# Patient Record
Sex: MALE | Race: WHITE | Employment: OTHER | URBAN - METROPOLITAN AREA
[De-identification: names, ages, dates, MRNs, and addresses within clinical notes are randomized per-mention and may not be internally consistent; named-entity substitution may affect disease eponyms.]

---

## 2017-01-30 ENCOUNTER — HOSPITAL ENCOUNTER (OUTPATIENT)
Dept: GENERAL RADIOLOGY | Age: 82
Discharge: HOME | End: 2017-01-30
Attending: INTERNAL MEDICINE
Payer: MEDICARE

## 2017-01-30 DIAGNOSIS — M25.531 RIGHT WRIST PAIN: ICD-10-CM

## 2017-01-30 PROCEDURE — 73110 X-RAY EXAM OF WRIST: CPT

## 2017-12-20 ENCOUNTER — HOSPITAL ENCOUNTER (OUTPATIENT)
Dept: CT IMAGING | Age: 82
Discharge: HOME | End: 2017-12-20
Attending: INTERNAL MEDICINE
Payer: MEDICARE

## 2017-12-20 DIAGNOSIS — R26.89 LOSS OF BALANCE: ICD-10-CM

## 2017-12-20 DIAGNOSIS — G30.1 ALZHEIMER'S DISEASE WITH LATE ONSET (HCC): ICD-10-CM

## 2017-12-20 DIAGNOSIS — F02.80 ALZHEIMER'S DISEASE WITH LATE ONSET (HCC): ICD-10-CM

## 2017-12-20 PROCEDURE — 70450 CT HEAD/BRAIN W/O DYE: CPT

## 2018-12-18 ENCOUNTER — DOCUMENTATION ONLY (OUTPATIENT)
Dept: NEUROLOGY | Age: 83
End: 2018-12-18

## 2018-12-28 ENCOUNTER — TELEPHONE (OUTPATIENT)
Dept: NEUROLOGY | Age: 83
End: 2018-12-28

## 2018-12-28 ENCOUNTER — OFFICE VISIT (OUTPATIENT)
Dept: NEUROLOGY | Age: 83
End: 2018-12-28

## 2018-12-28 VITALS
WEIGHT: 176 LBS | HEART RATE: 78 BPM | BODY MASS INDEX: 23.84 KG/M2 | RESPIRATION RATE: 18 BRPM | HEIGHT: 72 IN | DIASTOLIC BLOOD PRESSURE: 70 MMHG | SYSTOLIC BLOOD PRESSURE: 110 MMHG

## 2018-12-28 DIAGNOSIS — R41.3 MEMORY LOSS: ICD-10-CM

## 2018-12-28 DIAGNOSIS — R25.1 TREMOR OF BOTH HANDS: Primary | ICD-10-CM

## 2018-12-28 DIAGNOSIS — R26.89 SHUFFLING GAIT: ICD-10-CM

## 2018-12-28 RX ORDER — MEMANTINE HYDROCHLORIDE 10 MG/1
TABLET ORAL
Refills: 1 | COMMUNITY
Start: 2018-11-08

## 2018-12-28 RX ORDER — CYANOCOBALAMIN 1000 UG/ML
1000 INJECTION, SOLUTION INTRAMUSCULAR; SUBCUTANEOUS
COMMUNITY

## 2018-12-28 RX ORDER — ALLOPURINOL 100 MG/1
TABLET ORAL
Refills: 99 | COMMUNITY
Start: 2018-11-16

## 2018-12-28 RX ORDER — CARBIDOPA AND LEVODOPA 25; 100 MG/1; MG/1
TABLET ORAL
Qty: 90 TAB | Refills: 5 | Status: SHIPPED | OUTPATIENT
Start: 2018-12-28 | End: 2019-05-24 | Stop reason: SDUPTHER

## 2018-12-28 RX ORDER — MELOXICAM 15 MG/1
TABLET ORAL
Refills: 1 | COMMUNITY
Start: 2018-11-07

## 2018-12-28 RX ORDER — DONEPEZIL HYDROCHLORIDE 10 MG/1
TABLET, FILM COATED ORAL
Refills: 99 | COMMUNITY
Start: 2018-11-23

## 2018-12-28 NOTE — PROGRESS NOTES
Neurology Consult Note HISTORY PROVIDED BY: patient and spouse Chief Complaint:  
Chief Complaint Patient presents with  Tremors Subjective:  
 Naomi Brasher is a 82 y.o.  male who presents in consultation for hand tremors. The pt reports that other people have noticed tremors more than he has. They report tremors mainly at rest. Has not noticed trouble with activity. No change in handwriting. His wife has noticed change in walking, more shuffling, stooped over. Their son is a physician in [place] and felt the pt needed to see a neurologist.   His PCP has diagnosed pt with early Alzheimers. His psychiatrist diagnosed MCI over 4 years ago. He is taking Aricept 10mg daily and Namenda 10mg bid. His wife reports first noticing memory difficulties at least 10 years ago, knows this b/c they moved to a condo in an adult community 10 years ago b/c of his memory difficulties. Pt relays a story of having to be taken off of a plane due to a back issue and not being able to stand up when it landed, his wife indicates that this is not a true story. They definitely have noticed decline in memory. Pt first noticed trouble with reading 3 books at one time, something he has done his whole life. 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 He is still driving, has never gotten lost. No safety issues at home. She notes he cannot recall conversations from the night before. Sleeps well. He doesn't have any bladder incontinence. No family h/o dementia or neurological issues. Notes from PCP received and reviewed - OV with Dr. Michael Holguin 12/4/18- pt c/o right hand tremor. He felt his memory was about the same. Labs - TSH 3.57, CBC - Hgb 17.6 with  .8, CMP - unremarkable. B12 815 Past Medical History:  
Diagnosis Date  Alzheimer's disease with late onset  Arthritis  BPH (benign prostatic hyperplasia)  Chronic pain  Degenerative disc disease, lumbar   
 lumbosacral spine  Depression  Gout  Vitamin B12 deficiency Past Surgical History:  
Procedure Laterality Date  HX CERVICAL FUSION    
 anterior Social History Socioeconomic History  Marital status:  Spouse name: Not on file  Number of children: Not on file  Years of education: Not on file  Highest education level: Not on file Social Needs  Financial resource strain: Not on file  Food insecurity - worry: Not on file  Food insecurity - inability: Not on file  Transportation needs - medical: Not on file  Transportation needs - non-medical: Not on file Occupational History  Occupation: Retired from [employer], [employer] and [employer] Tobacco Use  Smoking status: Former Smoker Last attempt to quit:  Years since quittin.0  Smokeless tobacco: Never Used Substance and Sexual Activity  Alcohol use: Yes Alcohol/week: 7.0 oz Types: 14 Glasses of wine per week Drinks per session: 1 or 2 Binge frequency: Daily or almost daily Comment: 2 glass of wine per day  Drug use: No  
 Sexual activity: No  
  Partners: Female Other Topics Concern  Not on file Social History Narrative , lives in [place] with wife Family History Problem Relation Age of Onset  Kidney Disease Mother Dec in early 30's with kidney infection with kidney failure  Dementia Father Dec 77yo, dementia at an older age  Cancer Brother Colon, Dec 61yo  Cancer Son Objective:  
Review of Systems Constitutional: Negative. HENT: Negative. Eyes: Negative. Respiratory: Negative. Snores, occ apneas Cardiovascular: Positive for leg swelling. Gastrointestinal: Negative. Genitourinary: Negative. Musculoskeletal: Positive for joint pain and myalgias. Skin: Negative. Neurological: Negative. Endo/Heme/Allergies: Negative. Psychiatric/Behavioral: Positive for depression and memory loss. The patient is nervous/anxious. No Known Allergies Meds: Outpatient Medications Prior to Visit Medication Sig Dispense Refill  allopurinol (ZYLOPRIM) 100 mg tablet TAKE 1 TABLET EVERY DAY  99  
 meloxicam (MOBIC) 15 mg tablet TAKE 1 TABLET BY MOUTH EVERY DAY  1  
 donepezil (ARICEPT) 10 mg tablet 1 TABLET AT BEDTIME ONCE A DAY ORALLY  99  
 memantine (NAMENDA) 10 mg tablet TAKE 1 TABLET TWICE A DAY ORALLY 90 DAYS  1  
 vit A/vit C/vit E/zinc/copper (PRESERVISION AREDS PO) Take  by mouth.  cyanocobalamin (VITAMIN B-12) 1,000 mcg/mL injection 1,000 mcg by IntraMUSCular route every thirty (30) days.  Carboxymethylcellulose-Glycern (REFRESH OPTIVE) 0.5-0.9 % drop Apply  to eye.  dutaseride (AVODART) 0.5 mg capsule Take 0.5 mg by mouth daily.  multivitamin (ONE A DAY) tablet Take 1 Tab by mouth daily. No facility-administered medications prior to visit. Imaging: MRI Results (most recent): No results found for this or any previous visit. CT Results (most recent): 
Results from Hospital Encounter encounter on 12/20/17 CT HEAD WO CONT Narrative EXAM:  CT HEAD WO CONT INDICATION: Loss of balance. Gait abnormality. COMPARISON: None. TECHNIQUE: Axial noncontrast head CT from foramen magnum to vertex. Coronal and 
sagittal reformatted images were obtained. CT dose reduction was achieved 
through use of a standardized protocol tailored for this examination and 
automatic exposure control for dose modulation. Adaptive statistical iterative 
reconstruction (ASIR) was utilized. FINDINGS:  There is diffuse age-related parenchymal volume loss. The ventricles 
and sulci are age-appropriate without hydrocephalus. There is no mass effect or 
midline shift. There is no intracranial hemorrhage or extra-axial fluid collection. Scattered foci of low attenuation in the periventricular white 
matter most likely represent age-indeterminate microvascular ischemic changes. The gray-white matter differentiation is maintained. The basal cisterns are 
patent. The osseous structures are intact. There is mild mucosal thickening with aerated 
secretions in the right maxillary sinus. The remaining visualized paranasal 
sinuses and mastoid air cells are clear. Impression IMPRESSION:  
1. There is no acute intracranial hemorrhage. 2. There is age-indeterminate microvascular ischemic disease in the 
periventricular white matter. 3. Mucosal thickening with aerated secretions in the right maxillary sinus can 
be seen with acute sinusitis. Reviewed records in [facility] tab today Lab Review No results found for this or any previous visit. Exam: 
Visit Vitals /70 Pulse 78 Resp 18 Ht 6' (1.829 m) Wt 79.8 kg (176 lb) BMI 23.87 kg/m² General:  Alert, cooperative, no distress. Head:  Normocephalic, without obvious abnormality, atraumatic. Respiratory: 
Heart:   Non labored breathing Regular rate and rhythm, no murmurs Neck:   2+ carotids, no bruits Extremities: Warm. +cyanosis right hand and feet, edema R>L ankles, right 4th finger dupuytren's contracture. Pulses: 2+ radial pulses. Neurologic:  MS: Alert and oriented x 4, speech intact - hypophonia. Language intact, able to name, repeat, and follow all commands. Attention and fund of knowledge appropriate. Recent and remote memory impaired. Cranial Nerves: 
II: visual fields Full to confrontation II: pupils Equal, round, reactive to light II: optic disc   
III,VII: ptosis none III,IV,VI: extraocular muscles  EOMI, no nystagmus or diplopia V: facial light touch sensation  normal  
VII: facial muscle function   symmetric VIII: hearing intact IX: soft palate elevation  normal  
 XI: trapezius strength  5/5 XI: sternocleidomastoid strength 5/5 XII: tongue  Midline Motor: normal bulk and tone, end point tremor, tremor with out stretched hands,  Strength: 5/5 throughout, no PD Sensory: intact to LT, PP Coordination: FTN and HTS intact, CAIT intact,Romberg negative Gait: Slight shuffling, normal arm swing, stooped, normal turn, minimal difficulty with  tandem walk Reflexes: 2+ symmetric, 1+ at ankles, toes downgoing Assessment/Plan Pt is an 82 y.o.  male with recent onset of hand tremors at rest, shuffling gait, stooped posture, with progressive memory loss for the last 10 years, diagnosed by psychiatry 4 years ago with MCI, and at some point with Alzheimer's dementia by PCP, on Aricept 10mg daily and Namenda 10mg bid. Exam with cyanosis of right hand and feet, edema R>L ankles, right 4th finger dupuytren's contracture, memory impaired to history, language intact, hypophonia, slight tremor with outstretched hands, no resting tremor seen, slight shuffling, normal arm swing, stooped posture, turns normally, minimal difficulty with tandem walking. Discussed possibility of parkinson's vs secondary parkinsonism. Recommend MRI brain wo contrast to assess for NPH, stroke, or mass. Discussed diagnosis, treatment, and goals of treatment with primary Parkinson's disease. Recommend trial of Sinemet 25-100mg, 1/2 tab tid x 1 week, then 1 tab tid, side effects reviewed. F/u in clinic in 4-6 weeks, instructed to call in the interim if needed. ICD-10-CM ICD-9-CM 1. Tremor of both hands R25.1 781.0 MRI BRAIN WO CONT 2. Shuffling gait R26.89 781.2 MRI BRAIN WO CONT 3. Memory loss R41.3 780.93 MRI BRAIN WO CONT Signed: Owen Pritchard MD 
12/28/2018

## 2018-12-28 NOTE — PATIENT INSTRUCTIONS
PRESCRIPTION REFILL POLICY [facility] Statement to Patients April 1, 2014 In an effort to ensure the large volume of patient prescription refills is processed in the most efficient and expeditious manner, we are asking our patients to assist us by calling your Pharmacy for all prescription refills, this will include also your  Mail Order Pharmacy. The pharmacy will contact our office electronically to continue the refill process. Please do not wait until the last minute to call your pharmacy. We need at least 48 hours (2days) to fill prescriptions. We also encourage you to call your pharmacy before going to  your prescription to make sure it is ready. With regard to controlled substance prescription refill requests (narcotic refills) that need to be picked up at our office, we ask your cooperation by providing us with at least 72 hours (3days) notice that you will need a refill. We will not refill narcotic prescription refill requests after 4:00pm on any weekday, Monday through Thursday, or after 2:00pm on Fridays, or on the weekends. We encourage everyone to explore another way of getting your prescription refill request processed using [employer], our patient web portal through our electronic medical record system. [employer] is an efficient and effective way to communicate your medication request directly to the office and  downloadable as an wade on your smart phone . [employer] also features a review functionality that allows you to view your medication list as well as leave messages for your physician. Are you ready to get connected? If so please review the attatched instructions or speak to any of our staff to get you set up right away! Thank you so much for your cooperation. Should you have any questions please contact our Practice Administrator. The Physicians and Staff,  [facility] A Healthy Lifestyle: Care Instructions Your Care Instructions A healthy lifestyle can help you feel good, stay at a healthy weight, and have plenty of energy for both work and play. A healthy lifestyle is something you can share with your whole family. A healthy lifestyle also can lower your risk for serious health problems, such as high blood pressure, heart disease, and diabetes. You can follow a few steps listed below to improve your health and the health of your family. Follow-up care is a key part of your treatment and safety. Be sure to make and go to all appointments, and call your doctor if you are having problems. It's also a good idea to know your test results and keep a list of the medicines you take. How can you care for yourself at home? · Do not eat too much sugar, fat, or fast foods. You can still have dessert and treats now and then. The goal is moderation. · Start small to improve your eating habits. Pay attention to portion sizes, drink less juice and soda pop, and eat more fruits and vegetables. ? Eat a healthy amount of food. A 3-ounce serving of meat, for example, is about the size of a deck of cards. Fill the rest of your plate with vegetables and whole grains. ? Limit the amount of soda and sports drinks you have every day. Drink more water when you are thirsty. ? Eat at least 5 servings of fruits and vegetables every day. It may seem like a lot, but it is not hard to reach this goal. A serving or helping is 1 piece of fruit, 1 cup of vegetables, or 2 cups of leafy, raw vegetables. Have an apple or some carrot sticks as an afternoon snack instead of a candy bar. Try to have fruits and/or vegetables at every meal. 
· Make exercise part of your daily routine. You may want to start with simple activities, such as walking, bicycling, or slow swimming. Try to be active 30 to 60 minutes every day. You do not need to do all 30 to 60 minutes all at once.  For example, you can exercise 3 times a day for 10 or 20 minutes. Moderate exercise is safe for most people, but it is always a good idea to talk to your doctor before starting an exercise program. 
· Keep moving. Rei Garayer the lawn, work in the garden, or [employer]. Take the stairs instead of the elevator at work. · If you smoke, quit. People who smoke have an increased risk for heart attack, stroke, cancer, and other lung illnesses. Quitting is hard, but there are ways to boost your chance of quitting tobacco for good. ? Use nicotine gum, patches, or lozenges. ? Ask your doctor about stop-smoking programs and medicines. ? Keep trying. In addition to reducing your risk of diseases in the future, you will notice some benefits soon after you stop using tobacco. If you have shortness of breath or asthma symptoms, they will likely get better within a few weeks after you quit. · Limit how much alcohol you drink. Moderate amounts of alcohol (up to 2 drinks a day for men, 1 drink a day for women) are okay. But drinking too much can lead to liver problems, high blood pressure, and other health problems. Family health If you have a family, there are many things you can do together to improve your health. · Eat meals together as a family as often as possible. · Eat healthy foods. This includes fruits, vegetables, lean meats and dairy, and whole grains. · Include your family in your fitness plan. Most people think of activities such as jogging or tennis as the way to fitness, but there are many ways you and your family can be more active. Anything that makes you breathe hard and gets your heart pumping is exercise. Here are some tips: 
? Walk to do errands or to take your child to school or the bus. 
? Go for a family bike ride after dinner instead of watching TV. Where can you learn more? Go to http://sandra-tracy.info/. Enter K962 in the search box to learn more about \"A Healthy Lifestyle: Care Instructions. \" Current as of: December 7, 2017 Content Version: 11.8 © 8715-7220 [employer]. Care instructions adapted under license by [employer] (which disclaims liability or warranty for this information). If you have questions about a medical condition or this instruction, always ask your healthcare professional. [address] any warranty or liability for your use of this information.

## 2018-12-28 NOTE — TELEPHONE ENCOUNTER
Pt's wife calling back stating that the 8th of Feb doesn't work anymore, but she didn't know if march is too long for pt. Please call back

## 2018-12-28 NOTE — PROGRESS NOTES
New patient here for evaluation of tremors in both hands. Patient's wife reported tremors have been going on for several years.

## 2018-12-31 NOTE — TELEPHONE ENCOUNTER
I spoke with patient's spouse and she stated patient will keep appointment. He has a ride with a family member.

## 2019-01-12 ENCOUNTER — HOSPITAL ENCOUNTER (OUTPATIENT)
Dept: MRI IMAGING | Age: 84
Discharge: HOME | End: 2019-01-12
Attending: PSYCHIATRY & NEUROLOGY
Payer: MEDICARE

## 2019-01-12 DIAGNOSIS — R26.89 SHUFFLING GAIT: ICD-10-CM

## 2019-01-12 DIAGNOSIS — R25.1 TREMOR OF BOTH HANDS: ICD-10-CM

## 2019-01-12 DIAGNOSIS — R41.3 MEMORY LOSS: ICD-10-CM

## 2019-01-12 PROCEDURE — 70551 MRI BRAIN STEM W/O DYE: CPT

## 2019-01-16 NOTE — PROGRESS NOTES
Will review MRI brain wo contrast 1/12/18 with pt at f/u appt 2/8/19 - atrophy, especially temporal, minimal CIWM changes. No ventriculomegaly, no significant microhemorrhages.

## 2019-02-08 ENCOUNTER — OFFICE VISIT (OUTPATIENT)
Dept: NEUROLOGY | Age: 84
End: 2019-02-08

## 2019-02-08 ENCOUNTER — TELEPHONE (OUTPATIENT)
Dept: NEUROLOGY | Age: 84
End: 2019-02-08

## 2019-02-08 VITALS
HEIGHT: 72 IN | DIASTOLIC BLOOD PRESSURE: 80 MMHG | BODY MASS INDEX: 24.16 KG/M2 | SYSTOLIC BLOOD PRESSURE: 122 MMHG | HEART RATE: 75 BPM | WEIGHT: 178.4 LBS | RESPIRATION RATE: 18 BRPM | OXYGEN SATURATION: 100 %

## 2019-02-08 DIAGNOSIS — F02.80 LATE ONSET ALZHEIMER'S DISEASE WITHOUT BEHAVIORAL DISTURBANCE (HCC): ICD-10-CM

## 2019-02-08 DIAGNOSIS — G20 PARKINSON'S DISEASE (HCC): Primary | ICD-10-CM

## 2019-02-08 DIAGNOSIS — G30.1 LATE ONSET ALZHEIMER'S DISEASE WITHOUT BEHAVIORAL DISTURBANCE (HCC): ICD-10-CM

## 2019-02-08 NOTE — PROGRESS NOTES
Mr. Macario Linda presents today to follow up tremor and dementia. He is accompanied by his friend, Juan Carroll. Patient reports that his tremor has decreased since starting med.

## 2019-02-08 NOTE — TELEPHONE ENCOUNTER
I left message for patient's spouse to call back. Need to schedule follow up appointment in May and need to obtain MRI of brain from [facility]. Need to know which location patient had this done and will mail patient record release.

## 2019-02-08 NOTE — PROGRESS NOTES
Neurology Consult Note HISTORY PROVIDED BY: patient Chief Complaint:  
Chief Complaint Patient presents with  Tremors Subjective:  
Pt is an 82 y.o.  male initially and last seen in clinic on 12/28/18 with recent onset of hand tremors at rest, shuffling gait, stooped posture, with progressive memory loss for the last 10 years, diagnosed by psychiatry 4 years ago with MCI, and at some point with Alzheimer's dementia by PCP, on Aricept 10mg daily and Namenda 10mg bid. Exam with cyanosis of right hand and feet, edema R>L ankles, right 4th finger dupuytren's contracture, memory impaired to history, language intact, hypophonia, slight tremor with outstretched hands, no resting tremor seen, slight shuffling, normal arm swing, stooped posture, turns normally, minimal difficulty with tandem walking. Discussed possibility of parkinson's vs secondary parkinsonism. Recommended MRI brain wo contrast to assess for NPH, stroke, or mass. Discussed diagnosis, treatment, and goals of treatment with primary Parkinson's disease. Recommended trial of Sinemet 25-100mg, 1/2 tab tid x 1 week, then 1 tab tid. He returns for f/u. He tells me that his wife hasn't noticed him shaking as much. He cannot say if his gait is any different. No falls. MRI brain wo contrast 1/12/19 with atrophy, especially temporal, minimal CIWM changes. No ventriculomegaly, no significant microhemorrhages. Past Medical History:  
Diagnosis Date  Alzheimer's disease with late onset  Arthritis  BPH (benign prostatic hyperplasia)  Chronic pain  Degenerative disc disease, lumbar   
 lumbosacral spine  Depression  Gout  Vitamin B12 deficiency Past Surgical History:  
Procedure Laterality Date  HX CERVICAL FUSION    
 anterior Social History Socioeconomic History  Marital status:  Spouse name: Not on file  Number of children: Not on file  Years of education: Not on file  Highest education level: Not on file Social Needs  Financial resource strain: Not on file  Food insecurity - worry: Not on file  Food insecurity - inability: Not on file  Transportation needs - medical: Not on file  Transportation needs - non-medical: Not on file Occupational History  Occupation: Retired from [employer], [employer]  and fund raising Tobacco Use  Smoking status: Former Smoker Last attempt to quit: 1967 Years since quittin.1  Smokeless tobacco: Never Used Substance and Sexual Activity  Alcohol use: Yes Alcohol/week: 7.0 oz Types: 14 Glasses of wine per week Drinks per session: 1 or 2 Binge frequency: Daily or almost daily Comment: 2 glass of wine per day  Drug use: No  
 Sexual activity: No  
  Partners: Female Other Topics Concern  Not on file Social History Narrative , lives in [place] with wife Family History Problem Relation Age of Onset  Kidney Disease Mother Dec in early 30's with kidney infection with kidney failure  Dementia Father Dec 77yo, dementia at an older age  Cancer Brother Colon, Dec 59yo  Cancer Son Objective:  
ROS:  Per HPI, o/w reviewed and neg. No Known Allergies Meds: Outpatient Medications Prior to Visit Medication Sig Dispense Refill  allopurinol (ZYLOPRIM) 100 mg tablet TAKE 1 TABLET EVERY DAY  99  
 meloxicam (MOBIC) 15 mg tablet TAKE 1 TABLET BY MOUTH EVERY DAY  1  
 donepezil (ARICEPT) 10 mg tablet 1 TABLET AT BEDTIME ONCE A DAY ORALLY  99  
 memantine (NAMENDA) 10 mg tablet TAKE 1 TABLET TWICE A DAY ORALLY 90 DAYS  1  
 vit A/vit C/vit E/zinc/copper (PRESERVISION AREDS PO) Take  by mouth.  cyanocobalamin (VITAMIN B-12) 1,000 mcg/mL injection 1,000 mcg by IntraMUSCular route every thirty (30) days.  Carboxymethylcellulose-Glycern (REFRESH OPTIVE) 0.5-0.9 % drop Apply  to eye.  carbidopa-levodopa (SINEMET)  mg per tablet Take 1/2 tab by mouth three times a day x 1 week, then 1 tab three times a day 90 Tab 5  dutaseride (AVODART) 0.5 mg capsule Take 0.5 mg by mouth daily.  multivitamin (ONE A DAY) tablet Take 1 Tab by mouth daily. No facility-administered medications prior to visit. Imaging: MRI Results (most recent): 
Results from Hospital Encounter encounter on 01/12/19 MRI BRAIN WO CONT Narrative Clinical indication: Memory loss. Technical factors: Diffusion imaging, sagittal T1-weighted, axial T1-weighted T2-weighted FLAIR gradient echo coronal T2-weighted. No prior. Diffusion imaging does not show acute ischemic changes her. There is some mild 
atrophy and nonspecific white matter changes. There is no extra-axial fluid 
collection hemorrhage shift or masses. Incidental prominent paranasal sinus 
disease, flow voids in the major vessels at the base of the brain are present. No mass. Impression  impression: Atrophy and white matter disease. No acute findings and no masses. \  
 
 CT Results (most recent): 
Results from Hospital Encounter encounter on 12/20/17 CT HEAD WO CONT Narrative EXAM:  CT HEAD WO CONT INDICATION: Loss of balance. Gait abnormality. COMPARISON: None. TECHNIQUE: Axial noncontrast head CT from foramen magnum to vertex. Coronal and 
sagittal reformatted images were obtained. CT dose reduction was achieved 
through use of a standardized protocol tailored for this examination and 
automatic exposure control for dose modulation. Adaptive statistical iterative 
reconstruction (ASIR) was utilized. FINDINGS:  There is diffuse age-related parenchymal volume loss. The ventricles 
and sulci are age-appropriate without hydrocephalus. There is no mass effect or 
midline shift. There is no intracranial hemorrhage or extra-axial fluid 
collection. Scattered foci of low attenuation in the periventricular white matter most likely represent age-indeterminate microvascular ischemic changes. The gray-white matter differentiation is maintained. The basal cisterns are 
patent. The osseous structures are intact. There is mild mucosal thickening with aerated 
secretions in the right maxillary sinus. The remaining visualized paranasal 
sinuses and mastoid air cells are clear. Impression IMPRESSION:  
1. There is no acute intracranial hemorrhage. 2. There is age-indeterminate microvascular ischemic disease in the 
periventricular white matter. 3. Mucosal thickening with aerated secretions in the right maxillary sinus can 
be seen with acute sinusitis. Reviewed records in [facility] [employer] [facility] tab today Lab Review No results found for this or any previous visit. Exam: 
Visit Vitals /80 Pulse 75 Resp 18 Ht 6' (1.829 m) Wt 80.9 kg (178 lb 6.4 oz) SpO2 100% BMI 24.20 kg/m² General:  Alert, cooperative, no distress. Head:  Normocephalic, without obvious abnormality, atraumatic. Respiratory: 
Heart:   Non labored breathing Regular rate and rhythm, no murmurs Neck:     
Extremities: Warm. Right 4th finger dupuytren's contracture. Pulses: 2+ radial pulses. Neurologic:  MS: Alert and oriented x 4, speech intact - hypophonia. Language intact. Attention and fund of knowledge appropriate. Recent and remote memory impaired. Cranial Nerves: 
II: visual fields II: pupils II: optic disc   
III,VII: ptosis none III,IV,VI: extraocular muscles  EOMI, no nystagmus or diplopia V: facial light touch sensation VII: facial muscle function   symmetric VIII: hearing intact IX: soft palate elevation XI: trapezius strength XI: sternocleidomastoid strength XII: tongue Motor: normal bulk and tone, end point tremor, tremor with outstretched hands,  Strength: 5/5 throughout, no PD Sensory:  
Coordination: FTN and CAIT intact Gait: Slight shuffling, normal arm swing, stooped, normal turn Reflexes:   
 
 
Assessment/Plan Pt is an 83yo RH male with primary parkinson's vs secondary parkinsonism presenting in Dec, 2018 with recent onset of hand tremors at rest, shuffling gait, stooped posture, with progressive memory loss for the last 10 years, diagnosed by psychiatry 4 years ago with MCI, and at some point with Alzheimer's dementia by PCP, on Aricept 10mg daily and Namenda 10mg bid. Tremors possibly improved on Sinemet, unclear if pt has appreciated any change in gait. Exam - memory impaired to history, language intact, hypophonia, slight tremor with outstretched hands, no resting tremor seen, slight shuffling, normal arm swing, stooped posture, turns normally, minimal difficulty with tandem walking. MRI brain wo contrast 1/12/19 with atrophy, especially temporal, minimal CIWM changes. No ventriculomegaly, no significant microhemorrhages. Continue Sinemet 25-100mg, 1 tab tid. F/u in clinic in 3 months, instructed to call in the interim if needed. ICD-10-CM ICD-9-CM 1. Parkinson's disease ([facility] [address] G20 332.0 2. Late onset Alzheimer's disease without behavioral disturbance G30.1 331.0 F02.80 294.10 Signed: Mark Sexton MD 
2/8/2019

## 2019-02-08 NOTE — PATIENT INSTRUCTIONS
A Healthy Lifestyle: Care Instructions Your Care Instructions A healthy lifestyle can help you feel good, stay at a healthy weight, and have plenty of energy for both work and play. A healthy lifestyle is something you can share with your whole family. A healthy lifestyle also can lower your risk for serious health problems, such as high blood pressure, heart disease, and diabetes. You can follow a few steps listed below to improve your health and the health of your family. Follow-up care is a key part of your treatment and safety. Be sure to make and go to all appointments, and call your doctor if you are having problems. It's also a good idea to know your test results and keep a list of the medicines you take. How can you care for yourself at home? · Do not eat too much sugar, fat, or fast foods. You can still have dessert and treats now and then. The goal is moderation. · Start small to improve your eating habits. Pay attention to portion sizes, drink less juice and soda pop, and eat more fruits and vegetables. ? Eat a healthy amount of food. A 3-ounce serving of meat, for example, is about the size of a deck of cards. Fill the rest of your plate with vegetables and whole grains. ? Limit the amount of soda and sports drinks you have every day. Drink more water when you are thirsty. ? Eat at least 5 servings of fruits and vegetables every day. It may seem like a lot, but it is not hard to reach this goal. A serving or helping is 1 piece of fruit, 1 cup of vegetables, or 2 cups of leafy, raw vegetables. Have an apple or some carrot sticks as an afternoon snack instead of a candy bar. Try to have fruits and/or vegetables at every meal. 
· Make exercise part of your daily routine. You may want to start with simple activities, such as walking, bicycling, or slow swimming. Try to be active 30 to 60 minutes every day.  You do not need to do all 30 to 60 minutes all at once. For example, you can exercise 3 times a day for 10 or 20 minutes. Moderate exercise is safe for most people, but it is always a good idea to talk to your doctor before starting an exercise program. 
· Keep moving. Manolo Cue the lawn, work in the garden, or Dexmo. Take the stairs instead of the elevator at work. · If you smoke, quit. People who smoke have an increased risk for heart attack, stroke, cancer, and other lung illnesses. Quitting is hard, but there are ways to boost your chance of quitting tobacco for good. ? Use nicotine gum, patches, or lozenges. ? Ask your doctor about stop-smoking programs and medicines. ? Keep trying. In addition to reducing your risk of diseases in the future, you will notice some benefits soon after you stop using tobacco. If you have shortness of breath or asthma symptoms, they will likely get better within a few weeks after you quit. · Limit how much alcohol you drink. Moderate amounts of alcohol (up to 2 drinks a day for men, 1 drink a day for women) are okay. But drinking too much can lead to liver problems, high blood pressure, and other health problems. Family health If you have a family, there are many things you can do together to improve your health. · Eat meals together as a family as often as possible. · Eat healthy foods. This includes fruits, vegetables, lean meats and dairy, and whole grains. · Include your family in your fitness plan. Most people think of activities such as jogging or tennis as the way to fitness, but there are many ways you and your family can be more active. Anything that makes you breathe hard and gets your heart pumping is exercise. Here are some tips: 
? Walk to do errands or to take your child to school or the bus. 
? Go for a family bike ride after dinner instead of watching TV. Where can you learn more? Go to http://sandra-tracy.info/. Enter N983 in the search box to learn more about \"A Healthy Lifestyle: Care Instructions. \" Current as of: September 11, 2018 Content Version: 11.9 © 2832-6863 [employer]. Care instructions adapted under license by [employer] (which disclaims liability or warranty for this information). If you have questions about a medical condition or this instruction, always ask your healthcare professional. [place] [address] any warranty or liability for your use of this information.

## 2019-02-11 ENCOUNTER — TELEPHONE (OUTPATIENT)
Dept: NEUROLOGY | Age: 84
End: 2019-02-11

## 2019-02-11 NOTE — TELEPHONE ENCOUNTER
I advised patient's spouse that I am mailing her a release of records for MRI. She plans to research where it was done and will send the release back to me. I have also scheduled follow up appointment for patient.

## 2019-05-02 ENCOUNTER — HOSPITAL ENCOUNTER (OUTPATIENT)
Dept: NUCLEAR MEDICINE | Age: 84
Discharge: HOME | End: 2019-05-02
Attending: UROLOGY
Payer: MEDICARE

## 2019-05-02 DIAGNOSIS — C61 PROSTATE CANCER (HCC): ICD-10-CM

## 2019-05-02 PROCEDURE — 78306 BONE IMAGING WHOLE BODY: CPT

## 2019-05-24 ENCOUNTER — OFFICE VISIT (OUTPATIENT)
Dept: NEUROLOGY | Age: 84
End: 2019-05-24

## 2019-05-24 VITALS
RESPIRATION RATE: 16 BRPM | DIASTOLIC BLOOD PRESSURE: 60 MMHG | BODY MASS INDEX: 23.3 KG/M2 | OXYGEN SATURATION: 97 % | HEIGHT: 72 IN | HEART RATE: 83 BPM | WEIGHT: 172 LBS | SYSTOLIC BLOOD PRESSURE: 100 MMHG

## 2019-05-24 DIAGNOSIS — G20 PARKINSON'S DISEASE (HCC): Primary | ICD-10-CM

## 2019-05-24 DIAGNOSIS — R41.3 MEMORY LOSS: ICD-10-CM

## 2019-05-24 RX ORDER — CARBIDOPA AND LEVODOPA 25; 100 MG/1; MG/1
TABLET ORAL
Qty: 450 TAB | Refills: 1 | Status: SHIPPED | OUTPATIENT
Start: 2019-05-24

## 2019-05-24 NOTE — PROGRESS NOTES
Neurology Consult Note      HISTORY PROVIDED BY: patient and wife    Chief Complaint:   Chief Complaint   Patient presents with    Parkinsons Disease      Subjective:   Pt is an 83yo RH male last seen in clinic on 2/8/19 with primary parkinson's vs secondary parkinsonism presenting in Dec, 2018 with recent onset of hand tremors at rest, shuffling gait, stooped posture, with progressive memory loss for the last 10 years, diagnosed by psychiatry 4 years ago with MCI, and at some point with Alzheimer's dementia by PCP, on Aricept 10mg daily and Namenda 10mg bid. Tremors possibly improved on Sinemet, unclear if pt has appreciated any change in gait. Exam - memory impaired to history, language intact, hypophonia, slight tremor with outstretched hands, no resting tremor seen, slight shuffling, normal arm swing, stooped posture, turns normally, minimal difficulty with tandem walking. MRI brain wo contrast 1/12/19 with atrophy, especially temporal, minimal CIWM changes. No ventriculomegaly, no significant microhemorrhages. Continued Sinemet 25-100mg, 1 tab tid. He returns for f/u. They are moving to [place] where their son lives, initially with their son and then into a [facility] community. He cannot really say if he has noticed a tremor or gait change. His wife has noticing that he has a stooped posture and shuffles his feet. He had a fall last night, she attributes this to his wine intake, stating that it is an ongoing issue, noting that he has two glasses of wine nightly and doesn't eat much. He doesn't remember the fall. She asks if I could clarify his diagnosis, Parkinson's disease or Alzheimer's dementia. No hallucinations, no dizziness or lightheadedness, no syncope.      Past Medical History:   Diagnosis Date    Alzheimer's disease with late onset     Arthritis     BPH (benign prostatic hyperplasia)     Chronic pain     Degenerative disc disease, lumbar     lumbosacral spine    Depression     Gout  Vitamin B12 deficiency       Past Surgical History:   Procedure Laterality Date    HX CERVICAL FUSION      anterior      Social History     Socioeconomic History    Marital status:      Spouse name: Not on file    Number of children: Not on file    Years of education: Not on file    Highest education level: Not on file   Occupational History    Occupation: Retired from [employer]   Social Needs    Financial resource strain: Not on file    Food insecurity:     Worry: Not on file     Inability: Not on file   [employer] needs:     Medical: Not on file     Non-medical: Not on file   Tobacco Use    Smoking status: Former Smoker     Last attempt to quit: 1967     Years since quittin.4    Smokeless tobacco: Never Used   Substance and Sexual Activity    Alcohol use:  Yes     Alcohol/week: 7.0 oz     Types: 14 Glasses of wine per week     Drinks per session: 1 or 2     Binge frequency: Daily or almost daily     Comment: 2 glass of wine per day    Drug use: No    Sexual activity: Never     Partners: Female   Lifestyle    Physical activity:     Days per week: Not on file     Minutes per session: Not on file    Stress: Not on file   Relationships    Social connections:     Talks on phone: Not on file     Gets together: Not on file     Attends Buddhist service: Not on file     Active member of club or organization: Not on file     Attends meetings of clubs or organizations: Not on file     Relationship status: Not on file    Intimate partner violence:     Fear of current or ex partner: Not on file     Emotionally abused: Not on file     Physically abused: Not on file     Forced sexual activity: Not on file   Other Topics Concern    Not on file   Social History Narrative    , lives in [place] with wife     Family History   Problem Relation Age of Onset    Kidney Disease Mother         Dec in early 29's with kidney infection with kidney failure    Dementia Father         Dec 77yo, dementia at an older age   [facility] Cancer Brother         Colon, Dec 61yo    Cancer Son          Objective:   ROS:  Per HPI, o/w reviewed and neg. No Known Allergies     Meds:  Outpatient Medications Prior to Visit   Medication Sig Dispense Refill    allopurinol (ZYLOPRIM) 100 mg tablet TAKE 1 TABLET EVERY DAY  99    meloxicam (MOBIC) 15 mg tablet TAKE 1 TABLET BY MOUTH EVERY DAY  1    donepezil (ARICEPT) 10 mg tablet 1 TABLET AT BEDTIME ONCE A DAY ORALLY  99    memantine (NAMENDA) 10 mg tablet TAKE 1 TABLET TWICE A DAY ORALLY 90 DAYS  1    vit A/vit C/vit E/zinc/copper (PRESERVISION AREDS PO) Take  by mouth.  cyanocobalamin (VITAMIN B-12) 1,000 mcg/mL injection 1,000 mcg by IntraMUSCular route every thirty (30) days.  Carboxymethylcellulose-Glycern (REFRESH OPTIVE) 0.5-0.9 % drop Apply  to eye.  carbidopa-levodopa (SINEMET)  mg per tablet Take 1/2 tab by mouth three times a day x 1 week, then 1 tab three times a day 90 Tab 5    dutaseride (AVODART) 0.5 mg capsule Take 0.5 mg by mouth daily.  multivitamin (ONE A DAY) tablet Take 1 Tab by mouth daily. No facility-administered medications prior to visit. Imaging:  MRI Results (most recent):  Results from Hospital Encounter encounter on 01/12/19   MRI BRAIN WO CONT    Narrative Clinical indication: Memory loss. Technical factors: Diffusion imaging, sagittal T1-weighted, axial T1-weighted  T2-weighted FLAIR gradient echo coronal T2-weighted. No prior. Diffusion imaging does not show acute ischemic changes her. There is some mild  atrophy and nonspecific white matter changes. There is no extra-axial fluid  collection hemorrhage shift or masses. Incidental prominent paranasal sinus  disease, flow voids in the major vessels at the base of the brain are present. No mass. Impression  impression: Atrophy and white matter disease. No acute findings and no masses.   \      CT Results (most recent):  Results from [place] encounter on 12/20/17   CT HEAD WO CONT    Narrative EXAM:  CT HEAD WO CONT    INDICATION: Loss of balance. Gait abnormality. COMPARISON: None. TECHNIQUE: Axial noncontrast head CT from foramen magnum to vertex. Coronal and  sagittal reformatted images were obtained. CT dose reduction was achieved  through use of a standardized protocol tailored for this examination and  automatic exposure control for dose modulation. Adaptive statistical iterative  reconstruction (ASIR) was utilized. FINDINGS:  There is diffuse age-related parenchymal volume loss. The ventricles  and sulci are age-appropriate without hydrocephalus. There is no mass effect or  midline shift. There is no intracranial hemorrhage or extra-axial fluid  collection. Scattered foci of low attenuation in the periventricular white  matter most likely represent age-indeterminate microvascular ischemic changes. The gray-white matter differentiation is maintained. The basal cisterns are  patent. The osseous structures are intact. There is mild mucosal thickening with aerated  secretions in the right maxillary sinus. The remaining visualized paranasal  sinuses and mastoid air cells are clear. Impression IMPRESSION:   1. There is no acute intracranial hemorrhage. 2. There is age-indeterminate microvascular ischemic disease in the  periventricular white matter. 3. Mucosal thickening with aerated secretions in the right maxillary sinus can  be seen with acute sinusitis. Reviewed records in [facility] tab today    Lab Review   No results found for this or any previous visit. Exam:  Visit Vitals  /60   Pulse 83   Resp 16   Ht 6' (1.829 m)   Wt 78 kg (172 lb)   SpO2 97%   BMI 23.33 kg/m²     General:  Alert, cooperative, no distress. Head:  Normocephalic, without obvious abnormality, atraumatic.    Respiratory:  Heart:   Non labored breathing  Regular rate and rhythm, no murmurs   Neck:      Extremities: Warm. Right 4th finger dupuytren's contracture. Pulses: 2+ radial pulses. Neurologic:  MS: Alert and oriented x 4, speech intact - hypophonia. Language intact. Attention and fund of knowledge appropriate. Recent and remote memory impaired to history, but does well on MMSE.   Cranial Nerves:  II: visual fields    II: pupils    II: optic disc    III,VII: ptosis none   III,IV,VI: extraocular muscles  EOM with limited upgaze, no nystagmus or diplopia   V: facial light touch sensation     VII: facial muscle function   symmetric   VIII: hearing intact   IX: soft palate elevation     XI: trapezius strength     XI: sternocleidomastoid strength    XII: tongue       Motor: normal bulk and tone, end point tremors bilaterally, Strength: 5/5 throughout, no PD  Sensory:   Coordination: FTN, HTS, and CAIT intact  Gait: Slight shuffling, decreased arm swing in bilateral arms, stooped, normal turn  Reflexes:      Mini Mental State Exam 5/24/2019   What is the Year 1   What is the Season 1   What is the Date 1   What is the Day 1   What is the Month 1   Where are we State 1   Where are we Country 1   Where are we [place] or [facility] 1   Where are we Floor 1   Name three objects, then ask the patient to say them 3   Serial sevens Subtract 7 from 100 in increments 4   Ask for the three objects repeated above 3   Name a pencil 1   Name a watch 1   Have the patient repeat this phrase \"No ifs, ands, or buts\" 1   Three stage command: Take the paper in your right hand 1   Fold the paper in half 1   Put the paper on the floor 1   Read and obey the following: CLOSE YOUR EYES 1   Have the patient write a sentence 1   Have the patient copy a figure 1   Mini Mental Score 29         Assessment/Plan   Pt is an 83yo RH male with primary parkinson's vs secondary parkinsonism presenting in Dec, 2018 with recent onset of hand tremors at rest, shuffling gait, stooped posture, with progressive memory loss for the last 10 years, diagnosed by psychiatry 4 years ago with MCI, and at some point with Alzheimer's dementia by PCP, on Aricept 10mg daily and Namenda 10mg bid. Tremors possibly improved on Sinemet, unclear if has had any change in gait. Exam - memory impaired to history, MMSE score 29/30 missing 1 attn, hypophonia, bilateral end point tremors, no resting tremor seen, slight shuffling, decreased arm swing bilaterally, stooped posture, turns normally. MRI brain wo contrast 1/12/19 with atrophy, especially temporal, minimal CIWM changes. No ventriculomegaly, no significant microhemorrhages. Discussed difficulties in confirming a diagnosis. Suspected primary PD with dementia, though did very well on MMSE to have had a 10 year h/o memory loss, vs other dementia such as Lewy body dementia with parkinsonian features. Recommend increasing Sinemet 25-100mg, to 1 tab qid with option to add a 5th tab if needed. Recommend he f/u with a neurologist, preferably a movement disorders specialist, in 3-4 months, instructed to call if needed. Pt given information to obtain imaging on CD and medical records from office visits. ICD-10-CM ICD-9-CM    1. Parkinson's disease ([facility] G20 332.0    2. Memory loss R41.3 780.93        Signed:   Sven Garcia MD  5/24/2019

## 2019-05-24 NOTE — PROGRESS NOTES
Mr. Mary Wild presents today to follow up Parkinson's. He reported slight increase in shaking of hands.

## 2019-05-24 NOTE — PATIENT INSTRUCTIONS
A Healthy Lifestyle: Care Instructions Your Care Instructions A healthy lifestyle can help you feel good, stay at a healthy weight, and have plenty of energy for both work and play. A healthy lifestyle is something you can share with your whole family. A healthy lifestyle also can lower your risk for serious health problems, such as high blood pressure, heart disease, and diabetes. You can follow a few steps listed below to improve your health and the health of your family. Follow-up care is a key part of your treatment and safety. Be sure to make and go to all appointments, and call your doctor if you are having problems. It's also a good idea to know your test results and keep a list of the medicines you take. How can you care for yourself at home? · Do not eat too much sugar, fat, or fast foods. You can still have dessert and treats now and then. The goal is moderation. · Start small to improve your eating habits. Pay attention to portion sizes, drink less juice and soda pop, and eat more fruits and vegetables. ? Eat a healthy amount of food. A 3-ounce serving of meat, for example, is about the size of a deck of cards. Fill the rest of your plate with vegetables and whole grains. ? Limit the amount of soda and sports drinks you have every day. Drink more water when you are thirsty. ? Eat at least 5 servings of fruits and vegetables every day. It may seem like a lot, but it is not hard to reach this goal. A serving or helping is 1 piece of fruit, 1 cup of vegetables, or 2 cups of leafy, raw vegetables. Have an apple or some carrot sticks as an afternoon snack instead of a candy bar. Try to have fruits and/or vegetables at every meal. 
· Make exercise part of your daily routine. You may want to start with simple activities, such as walking, bicycling, or slow swimming. Try to be active 30 to 60 minutes every day.  You do not need to do all 30 to 60 minutes all at once. For example, you can exercise 3 times a day for 10 or 20 minutes. Moderate exercise is safe for most people, but it is always a good idea to talk to your doctor before starting an exercise program. 
· Keep moving. Lisandra Valles the lawn, work in the garden, or [employer]. Take the stairs instead of the elevator at work. · If you smoke, quit. People who smoke have an increased risk for heart attack, stroke, cancer, and other lung illnesses. Quitting is hard, but there are ways to boost your chance of quitting tobacco for good. ? Use nicotine gum, patches, or lozenges. ? Ask your doctor about stop-smoking programs and medicines. ? Keep trying. In addition to reducing your risk of diseases in the future, you will notice some benefits soon after you stop using tobacco. If you have shortness of breath or asthma symptoms, they will likely get better within a few weeks after you quit. · Limit how much alcohol you drink. Moderate amounts of alcohol (up to 2 drinks a day for men, 1 drink a day for women) are okay. But drinking too much can lead to liver problems, high blood pressure, and other health problems. Family health If you have a family, there are many things you can do together to improve your health. · Eat meals together as a family as often as possible. · Eat healthy foods. This includes fruits, vegetables, lean meats and dairy, and whole grains. · Include your family in your fitness plan. Most people think of activities such as jogging or tennis as the way to fitness, but there are many ways you and your family can be more active. Anything that makes you breathe hard and gets your heart pumping is exercise. Here are some tips: 
? Walk to do errands or to take your child to school or the bus. 
? Go for a family bike ride after dinner instead of watching TV. Where can you learn more? Go to http://sandra-tracy.info/. Enter D662 in the search box to learn more about \"A Healthy Lifestyle: Care Instructions. \" Current as of: September 11, 2018 Content Version: 11.9 © 3305-8914 [employer], [employer]. Care instructions adapted under license by [employer] (which disclaims liability or warranty for this information). If you have questions about a medical condition or this instruction, always ask your healthcare professional. [address] any warranty or liability for your use of this information. He should see a Neurologist in 3-4 months, a movement disorders specialist would be preferred.

## 2019-05-24 NOTE — LETTER
5/24/19 Patient: Blanca Canales YOB: 1935 Date of Visit: 5/24/2019 Asim Blackmon MD 
[facility] [address] [id number] VIA Facsimile: [phone] Dear Asim Blackmon MD, Thank you for referring Mr. Zeferino Peña to [address] [place] [address] for evaluation. My notes for this consultation are attached. If you have questions, please do not hesitate to call me. I look forward to following your patient along with you. Sincerely, Shonda Malone MD